# Patient Record
Sex: MALE | Race: WHITE
[De-identification: names, ages, dates, MRNs, and addresses within clinical notes are randomized per-mention and may not be internally consistent; named-entity substitution may affect disease eponyms.]

---

## 2019-01-11 ENCOUNTER — HOSPITAL ENCOUNTER (EMERGENCY)
Dept: HOSPITAL 53 - M ED | Age: 64
Discharge: HOME | End: 2019-01-11
Payer: MEDICAID

## 2019-01-11 VITALS — SYSTOLIC BLOOD PRESSURE: 123 MMHG | DIASTOLIC BLOOD PRESSURE: 84 MMHG

## 2019-01-11 VITALS — HEIGHT: 67 IN | WEIGHT: 147.71 LBS | BODY MASS INDEX: 23.18 KG/M2
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
## 2019-01-11 DIAGNOSIS — S70.01XA: Primary | ICD-10-CM

## 2019-01-11 DIAGNOSIS — F17.210: ICD-10-CM

## 2019-01-11 DIAGNOSIS — S42.255A: ICD-10-CM

## 2019-01-11 DIAGNOSIS — F10.10: ICD-10-CM

## 2019-01-11 LAB
ALBUMIN SERPL BCG-MCNC: 3.6 GM/DL (ref 3.2–5.2)
ALT SERPL W P-5'-P-CCNC: 33 U/L (ref 12–78)
BASOPHILS # BLD AUTO: 0 10^3/UL (ref 0–0.2)
BASOPHILS NFR BLD AUTO: 0.2 % (ref 0–1)
BILIRUB CONJ SERPL-MCNC: 0.5 MG/DL (ref 0–0.2)
BILIRUB SERPL-MCNC: 1.2 MG/DL (ref 0.2–1)
BUN SERPL-MCNC: 13 MG/DL (ref 7–18)
CALCIUM SERPL-MCNC: 9.4 MG/DL (ref 8.8–10.2)
CHLORIDE SERPL-SCNC: 96 MEQ/L (ref 98–107)
CO2 SERPL-SCNC: 24 MEQ/L (ref 21–32)
CREAT SERPL-MCNC: 0.5 MG/DL (ref 0.7–1.3)
EOSINOPHIL # BLD AUTO: 0 10^3/UL (ref 0–0.5)
EOSINOPHIL NFR BLD AUTO: 0 % (ref 0–3)
ETHANOL SERPL-MCNC: < 0.003 % (ref 0–0.01)
GFR SERPL CREATININE-BSD FRML MDRD: > 60 ML/MIN/{1.73_M2} (ref 49–?)
GLUCOSE SERPL-MCNC: 71 MG/DL (ref 70–100)
HCT VFR BLD AUTO: 36.1 % (ref 42–52)
HGB BLD-MCNC: 12.2 G/DL (ref 13.5–17.5)
INR PPP: 1.02
LYMPHOCYTES # BLD AUTO: 0.4 10^3/UL (ref 1.5–4.5)
LYMPHOCYTES NFR BLD AUTO: 4.3 % (ref 24–44)
MCH RBC QN AUTO: 34.7 PG (ref 27–33)
MCHC RBC AUTO-ENTMCNC: 33.8 G/DL (ref 32–36.5)
MCV RBC AUTO: 102.6 FL (ref 80–96)
MONOCYTES # BLD AUTO: 1 10^3/UL (ref 0–0.8)
MONOCYTES NFR BLD AUTO: 11.5 % (ref 0–5)
NEUTROPHILS # BLD AUTO: 7.1 10^3/UL (ref 1.8–7.7)
NEUTROPHILS NFR BLD AUTO: 83.4 % (ref 36–66)
PLATELET # BLD AUTO: 155 10^3/UL (ref 150–450)
POTASSIUM SERPL-SCNC: 3.9 MEQ/L (ref 3.5–5.1)
PROT SERPL-MCNC: 7.5 GM/DL (ref 6.4–8.2)
PROTHROMBIN TIME: 13.5 SECONDS (ref 12.1–14.4)
RBC # BLD AUTO: 3.52 10^6/UL (ref 4.3–6.1)
SODIUM SERPL-SCNC: 132 MEQ/L (ref 136–145)
WBC # BLD AUTO: 8.5 10^3/UL (ref 4–10)

## 2019-01-11 PROCEDURE — 99285 EMERGENCY DEPT VISIT HI MDM: CPT

## 2019-01-11 PROCEDURE — 86850 RBC ANTIBODY SCREEN: CPT

## 2019-01-11 PROCEDURE — 80048 BASIC METABOLIC PNL TOTAL CA: CPT

## 2019-01-11 PROCEDURE — 73030 X-RAY EXAM OF SHOULDER: CPT

## 2019-01-11 PROCEDURE — 73502 X-RAY EXAM HIP UNI 2-3 VIEWS: CPT

## 2019-01-11 PROCEDURE — 93005 ELECTROCARDIOGRAM TRACING: CPT

## 2019-01-11 PROCEDURE — 96361 HYDRATE IV INFUSION ADD-ON: CPT

## 2019-01-11 PROCEDURE — 94760 N-INVAS EAR/PLS OXIMETRY 1: CPT

## 2019-01-11 PROCEDURE — 71045 X-RAY EXAM CHEST 1 VIEW: CPT

## 2019-01-11 PROCEDURE — 96375 TX/PRO/DX INJ NEW DRUG ADDON: CPT

## 2019-01-11 PROCEDURE — 86900 BLOOD TYPING SEROLOGIC ABO: CPT

## 2019-01-11 PROCEDURE — 85610 PROTHROMBIN TIME: CPT

## 2019-01-11 PROCEDURE — 72192 CT PELVIS W/O DYE: CPT

## 2019-01-11 PROCEDURE — 85025 COMPLETE CBC W/AUTO DIFF WBC: CPT

## 2019-01-11 PROCEDURE — 96374 THER/PROPH/DIAG INJ IV PUSH: CPT

## 2019-01-11 PROCEDURE — 86901 BLOOD TYPING SEROLOGIC RH(D): CPT

## 2019-01-11 PROCEDURE — 80076 HEPATIC FUNCTION PANEL: CPT

## 2019-01-11 NOTE — REP
AP PELVIS WITH BILATERAL HIPS:  01/11/2019.

 

Clinical history:  Trauma.

 

Findings:  No prior studies.

 

AP pelvis:  The pelvic ring is intact.  The bones are demineralized.  There are

degenerative changes in the lower lumbar spine in its facets and disc spaces

particularly at L4-5 and L5-S1.  Some sclerosis of those SI joints, bilateral hip

osteoarthritic changes are seen.  Pubic rami and symphysis pubis are without any

visible fractures.

 

Right hip:  AP and frog-leg views show degenerative changes with marginal

osteophytes acetabulum and femoral head.  No evidence of AVN or fracture.  Bones

are demineralized.

 

Left hip:  AP and frog-leg views show degenerative changes with marginal

osteophytes acetabulum and femoral head. There is some soft tissue calcification

adjacent to the hip but not deftly in the joint.  I suspect it is near muscular

insertion on the greater trochanter superiorly.  There are vascular

calcifications in the femoral and iliac arteries. No evidence of AVN or fracture

of left hip.  Irregular margins of the inferior pubic ramus could reflect a

nondisplaced fracture.  Bones are demineralized.

 

Impression:

 

1.  Advanced degenerative changes of the lower lumbar spine less severe in the

right hip and SI joints.

 

2.  The left hip showed degenerative changes without definite fracture.  However

the left inferior pubic ramus shows irregular margin and disruption of the

obturator ring smooth contour.  I could not exclude a nondisplaced fracture of

that inferior pubic ramus.

 

 

Electronically Signed by

Everette Knight MD 01/11/2019 05:00 P

## 2019-01-11 NOTE — REP
LEFT SHOULDER, COMPLETE:  01/11/2019.

 

Clinical history:  Trauma, patient fell.

 

Findings:  Only two views could be performed due to patient condition.  Appears

to be a fracture at the humeral neck of the proximal metaphysis with fracture

through the greater tuberosity laterally.  There are degenerative changes of the

AC and glenohumeral joint.  The bones are demineralized and there is an

aggressive osteoporotic pattern of the humeral shaft.  Appearance suggest a lytic

process.  Metastatic disease or myeloma could give this appearance versus

aggressive osteoporosis.  The clavicle and visualized ribs are without acute

fracture.  There are degenerative changes in the spine.

 

Impression:

 

1.  Mildly impacted fracture of the surgical neck of the humerus with a

nondisplaced fracture of the greater tuberosity of the humeral head.

Demineralization with aggressive osteoporotic pattern and lytic changes in the

humeral shaft are noted. There are degenerative changes of the shoulder and AC

joint as well as the spine.  I have no prior studies for comparison.  Metastatic

disease or myeloma are considerations with this appearance.

 

 

 

 

Electronically Signed by

Everette Knight MD 01/11/2019 04:59 P

## 2019-01-11 NOTE — ECGEPIP
Stationary ECG Study

                           [facility] - ED

                                       

                                       Test Date:    2019

Pat Name:     GOPI LINTON         Department:   

Patient ID:   [id number]                 Room:         -

Gender:       M                        Technician:   [facility]

:          1955               Requested By: HELGA CRUZ

Order Number: [id number]     Reading MD:   Jennifer Loya

                                 Measurements

Intervals                              Axis          

Rate:         101                      P:            39

NY:           186                      QRS:          19

QRSD:         96                       T:            61

QT:           322                                    

QTc:          417                                    

                           Interpretive Statements

SINUS TACHYCARDIA

ABNORMAL RHYTHM ECG

LOW VOLTAGE LIMB

PRWP

NO PRIOR FOR COMPARISON

Electronically Signed On 2019 14:00:26 EST by Jennifer Loya

## 2019-01-11 NOTE — REP
AP PORTABLE CHEST:  01/11/2019.

 

Clinical history:  Trauma.  Patient fell.

 

Findings:  There are no prior studies. There is elevation left diaphragm with

volume loss.  Heart size normal for AP portable technique.  The aorta is tortuous

and calcified.  Mediastinal contours are typical for AP supine portable technique

and low level of inflation, no gross mass.  There is no evidence of gross

pneumothorax, infiltrate or layering effusion.  No evidence of edema.  The bones

are demineralized.

 

Impression:

 

1.  Elevated left diaphragm with volume loss left hemithorax.

 

2.  Degenerative changes in the spine and demineralization..  No gross

cardiomegaly, edema, layering effusion or obvious pneumothorax.  Very limited

portable supine exam.

 

 

Electronically Signed by

Everette Knight MD 01/11/2019 04:59 P

## 2019-01-11 NOTE — REP
CT STUDY OF THE PELVIS WITHOUT CONTRAST:

 

HISTORY:  Rule out left inferior pubic ramus fracture.

 

Comparison is made with today's radiographs.  There are no old radiographs

available.

 

CT FINDINGS:  There is old post-traumatic deformity of the inferior pubic ramus.

No acute fracture is evident.  No other evidence of pelvic fractures is seen.  No

sacral fracture is noted.  No proximal femur fracture is appreciated.

 

There is a left inguinal hernia transmitting an unobstructed loop of small bowel

and some abdominal fat into the inguinal canal.  There are degenerative changes

at the lumbosacral junction.  Prostate calcifications are noted.

 

IMPRESSION:

 

Old healed inferior pubic ramus fracture on the left.  No acute pelvic or hip

fracture seen.  Left inguinal hernia transmitting a short segment of small bowel

without evidence of obstruction.

 

 

Electronically Signed by

Carlos Ventura MD 01/11/2019 03:17 P